# Patient Record
Sex: MALE | Race: WHITE | NOT HISPANIC OR LATINO | Employment: FULL TIME | URBAN - METROPOLITAN AREA
[De-identification: names, ages, dates, MRNs, and addresses within clinical notes are randomized per-mention and may not be internally consistent; named-entity substitution may affect disease eponyms.]

---

## 2019-09-10 ENCOUNTER — OFFICE VISIT (OUTPATIENT)
Dept: URGENT CARE | Facility: CLINIC | Age: 54
End: 2019-09-10
Payer: COMMERCIAL

## 2019-09-10 VITALS
DIASTOLIC BLOOD PRESSURE: 76 MMHG | OXYGEN SATURATION: 97 % | SYSTOLIC BLOOD PRESSURE: 135 MMHG | WEIGHT: 188 LBS | TEMPERATURE: 97.4 F | HEART RATE: 54 BPM | RESPIRATION RATE: 18 BRPM | BODY MASS INDEX: 27.85 KG/M2 | HEIGHT: 69 IN

## 2019-09-10 DIAGNOSIS — M54.2 NECK PAIN: Primary | ICD-10-CM

## 2019-09-10 PROCEDURE — 99203 OFFICE O/P NEW LOW 30 MIN: CPT | Performed by: PHYSICIAN ASSISTANT

## 2019-09-10 RX ORDER — CHLORAL HYDRATE 500 MG
1000 CAPSULE ORAL DAILY
COMMUNITY
End: 2022-03-30

## 2019-09-10 RX ORDER — METHOCARBAMOL 500 MG/1
500 TABLET, FILM COATED ORAL 3 TIMES DAILY
Qty: 20 TABLET | Refills: 0 | Status: SHIPPED | OUTPATIENT
Start: 2019-09-10 | End: 2019-10-07

## 2019-09-10 RX ORDER — MELOXICAM 7.5 MG/1
7.5 TABLET ORAL DAILY
Qty: 30 TABLET | Refills: 0 | Status: SHIPPED | OUTPATIENT
Start: 2019-09-10 | End: 2019-10-07

## 2019-09-10 RX ORDER — ASPIRIN 81 MG/1
81 TABLET, CHEWABLE ORAL DAILY
COMMUNITY
End: 2022-03-30

## 2019-09-10 NOTE — LETTER
September 10, 2019     Patient: Aleksandra Rincon   YOB: 1965   Date of Visit: 9/10/2019       To Whom It May Concern: It is my medical opinion that Aleksandra Rincon may return to work on 9/11/2019  If you have any questions or concerns, please don't hesitate to call           Sincerely,        Nino Corbin PA-C    CC: No Recipients

## 2019-09-10 NOTE — PROGRESS NOTES
[facility]        NAME: Bernadine Andrade is a 48 y o  male  : 1965    MRN: [id number]  DATE: September 10, 2019  TIME: 8:44 AM    Assessment and Plan   Neck pain [M54 2]  1  Neck pain  meloxicam (MOBIC) 7 5 mg tablet    methocarbamol (ROBAXIN) 500 mg tablet         Patient Instructions     Take Mobic as prescribed (starting tomorrow) and Robaxin  Do not take Mobic with other NSAIDs  Do not drive or operate heavy machinery while taking Robaxin  Try new pillow   Rest (for no longer than 24 hours)  Stretching exercises  Alternate ice and heat  Consider physical therapy if no improvement after 1 week  Follow up with PCP in 3-5 days  Proceed to  ER if symptoms worsen  Chief Complaint     Chief Complaint   Patient presents with    Neck Pain     neck pain started Saturday and has been getting worst,when pt  moves head to the left it is worst         History of Present Illness       Neck Pain    This is a new problem  Episode onset: Saturday  The problem occurs constantly  The problem has been unchanged  The pain is associated with a sleep position  The pain is present in the left side  The pain is at a severity of 9/10 (only with movement)  Exacerbated by: turning head to the left  Pertinent negatives include no chest pain, fever, headaches, numbness, tingling or weakness  He has tried heat (lidocaine patch-mild help) for the symptoms  Review of Systems   Review of Systems   Constitutional: Negative for activity change, appetite change, chills, fatigue and fever  Respiratory: Negative for cough and shortness of breath  Cardiovascular: Negative for chest pain and palpitations  Gastrointestinal: Negative for abdominal pain, blood in stool, diarrhea, nausea and vomiting  Genitourinary: Negative for dysuria, flank pain, frequency and urgency  Musculoskeletal: Positive for neck pain  Negative for myalgias and neck stiffness  Skin: Negative for rash     Neurological: Negative for dizziness, tingling, weakness, light-headedness, numbness and headaches  Current Medications       Current Outpatient Medications:     aspirin 81 mg chewable tablet, Chew 81 mg daily, Disp: , Rfl:     Omega-3 Fatty Acids (FISH OIL) 1,000 mg, Take 1,000 mg by mouth daily, Disp: , Rfl:     meloxicam (MOBIC) 7 5 mg tablet, Take 1 tablet (7 5 mg total) by mouth daily, Disp: 30 tablet, Rfl: 0    methocarbamol (ROBAXIN) 500 mg tablet, Take 1 tablet (500 mg total) by mouth 3 (three) times a day, Disp: 20 tablet, Rfl: 0    Current Allergies     Allergies as of 09/10/2019    (No Known Allergies)            The following portions of the patient's history were reviewed and updated as appropriate: allergies, current medications, past family history, past medical history, past social history, past surgical history and problem list      Past Medical History:   Diagnosis Date    Known health problems: none        Past Surgical History:   Procedure Laterality Date    ELBOW SURGERY      LEG SURGERY         History reviewed  No pertinent family history  Medications have been verified  Objective   /76   Pulse (!) 54   Temp (!) 97 4 °F (36 3 °C)   Resp 18   Ht 5' 9" (1 753 m)   Wt 85 3 kg (188 lb)   SpO2 97%   BMI 27 76 kg/m²        Physical Exam     Physical Exam   Constitutional: He appears well-developed and well-nourished  No distress  HENT:   Head: Normocephalic and atraumatic  Neck: Normal range of motion  Lateral cervical tenderness with L neck rotation  Cardiovascular: Normal rate, regular rhythm, normal heart sounds and intact distal pulses  Exam reveals no gallop and no friction rub  No murmur heard  Pulmonary/Chest: Effort normal and breath sounds normal  No respiratory distress  He has no wheezes  He has no rales  He exhibits no tenderness  Musculoskeletal: He exhibits no edema, tenderness or deformity  UE MS 5/5 bilaterally  Neurological: He is alert  He has normal reflexes  He displays normal reflexes  No sensory deficit  Coordination normal    Skin: Skin is warm  Psychiatric: He has a normal mood and affect  His behavior is normal  Judgment and thought content normal    Vitals reviewed

## 2019-09-10 NOTE — PATIENT INSTRUCTIONS
Take Mobic as prescribed (starting tomorrow) and Robaxin  Do not take Mobic with other NSAIDs  Do not drive or operate heavy machinery while taking Robaxin  Try new pillow   Rest (for no longer than 24 hours)  Stretching exercises  Alternate ice and heat  Consider physical therapy if no improvement after 1 week  Follow up with PCP in 3-5 days  Proceed to  ER if symptoms worsen  Acute Neck Pain   WHAT YOU NEED TO KNOW:   Acute neck pain starts suddenly, increases quickly, and goes away in a few days  The pain may come and go, or be worse with certain movements  The pain may be only in your neck, or it may move to your arms, back, or shoulders  You may also have pain that starts in another body area and moves to your neck  DISCHARGE INSTRUCTIONS:   Return to the emergency department if:   · You have an injury that causes neck pain and shooting pain down your arms or legs  · Your neck pain suddenly becomes severe  · You have neck pain along with numbness, tingling, or weakness in your arms or legs  · You have a stiff neck, a headache, and a fever  Contact your healthcare provider if:   · You have new or worsening symptoms  · Your symptoms continue even after treatment  · You have questions or concerns about your condition or care  Medicines:   · NSAIDs , such as ibuprofen, help decrease swelling, pain, and fever  This medicine is available without a doctor's order  Ask your healthcare provider which medicine to take and how often to take it  Follow directions  NSAIDs can cause stomach bleeding or kidney problems if not taken correctly  If you take blood thinner medicine, always ask if NSAIDs are safe for you  · Acetaminophen  helps decrease pain and fever  Ask your healthcare provider how much to take and how often to take it  Follow directions  Acetaminophen can cause liver damage if not taken correctly  · Steroid medicine  may be used to reduce inflammation   This can help relieve pain caused by swelling  · Take your medicine as directed  Contact your healthcare provider if you think your medicine is not helping or if you have side effects  Tell him or her if you are allergic to any medicine  Keep a list of the medicines, vitamins, and herbs you take  Include the amounts, and when and why you take them  Bring the list or the pill bottles to follow-up visits  Carry your medicine list with you in case of an emergency  Manage or prevent acute neck pain:   · Rest your neck as directed  Do not make sudden movements, such as turning your head quickly  Your healthcare provider may recommend you wear a cervical collar for a short time  The collar will prevent you from moving your head  This will give your neck time to heal if an injury is causing your neck pain  Ask your healthcare provider when you can return to sports or other normal daily activities  · Apply heat as directed  Heat helps relieve pain and swelling  Use a heat wrap, or soak a small towel in warm water  Wring out the extra water  Apply the heat wrap or towel for 20 minutes every hour, or as directed  · Apply ice as directed  Ice helps relieve pain and swelling, and can help prevent tissue damage  Use an ice pack, or put ice in a bag  Cover the ice pack or back with a towel before you apply it to your neck  Apply the ice pack or ice for 15 minutes every hour, or as directed  Your healthcare provider can tell you how often to apply ice  · Do neck exercises as directed  Neck exercises help strengthen the muscles and increase range of motion  Your healthcare provider will tell you which exercises are right for you  He may give you instructions, or he may recommend that you work with a physical therapist  Your healthcare provider or therapist can make sure you are doing the exercises correctly  · Maintain good posture  Try to keep your head and shoulders lifted when you sit   If you work in front of a computer, make sure the monitor is at the right level  You should not need to look up down to see the screen  You should also not have to lean forward to be able to read what is on the screen  Make sure your keyboard, mouse, and other computer items are placed where you do not have to extend your shoulder to reach them  Get up often if you work in front of a computer or sit for long periods of time  Stretch or walk around to keep your neck muscles loose  Follow up with your healthcare provider as directed: Your healthcare provider may refer you to a specialist if your pain does not get better with treatment  Write down your questions so you remember to ask them during your visits  © 2017 [facility] Information is for End User's use only and may not be sold, redistributed or otherwise used for commercial purposes  All illustrations and images included in CareNotes® are the copyrighted property of [employer]  or [employer]  The above information is an  only  It is not intended as medical advice for individual conditions or treatments  Talk to your doctor, nurse or pharmacist before following any medical regimen to see if it is safe and effective for you

## 2019-09-28 ENCOUNTER — OFFICE VISIT (OUTPATIENT)
Dept: URGENT CARE | Facility: CLINIC | Age: 54
End: 2019-09-28
Payer: COMMERCIAL

## 2019-09-28 VITALS
RESPIRATION RATE: 18 BRPM | OXYGEN SATURATION: 95 % | HEIGHT: 69 IN | TEMPERATURE: 97 F | WEIGHT: 186.29 LBS | HEART RATE: 70 BPM | BODY MASS INDEX: 27.59 KG/M2

## 2019-09-28 DIAGNOSIS — S71.111A LACERATION OF SKIN OF RIGHT THIGH, INITIAL ENCOUNTER: Primary | ICD-10-CM

## 2019-09-28 DIAGNOSIS — Z23 NEED FOR TETANUS BOOSTER: ICD-10-CM

## 2019-09-28 PROCEDURE — 99213 OFFICE O/P EST LOW 20 MIN: CPT | Performed by: EMERGENCY MEDICINE

## 2019-09-28 PROCEDURE — 12002 RPR S/N/AX/GEN/TRNK2.6-7.5CM: CPT | Performed by: EMERGENCY MEDICINE

## 2019-09-28 PROCEDURE — 90471 IMMUNIZATION ADMIN: CPT | Performed by: EMERGENCY MEDICINE

## 2019-09-28 PROCEDURE — 90715 TDAP VACCINE 7 YRS/> IM: CPT

## 2019-09-28 RX ORDER — CEPHALEXIN 500 MG/1
500 CAPSULE ORAL EVERY 8 HOURS SCHEDULED
Qty: 15 CAPSULE | Refills: 0 | Status: SHIPPED | OUTPATIENT
Start: 2019-09-28 | End: 2019-10-03

## 2019-09-28 NOTE — PATIENT INSTRUCTIONS
Staple Care   WHAT YOU NEED TO KNOW:   Staples are often used to close a wound  Your staples may be placed for 3 to 14 days, depending on the location of your wound  DISCHARGE INSTRUCTIONS:   Care for your wound:   · Clean:      ¨ You may be able to shower in 24 hours  Do not soak your wound under water  ¨ Gently wash your wound with soap and warm water daily  Lightly pat it dry  Do not cover your wound unless your healthcare provider tells you to  ¨ You may also need to clean your wound with a mixture of hydrogen peroxide and water  Ask how to do this  ¨ Do not apply ointment or cream to the wound unless your healthcare provider tells you to  · Elevate:      ¨ Rest any arm or leg that has a wound on pillows above the level of your heart  Do this as often as possible for 2 days  This will help decrease swelling and pain, and help you heal faster  · Minimize scarring:      ¨ Avoid sunshine on your wound to reduce scarring  Follow up with your healthcare provider as directed: You may need to return for a wound checkup 3 days after your staples are placed  Ask when you should return to get your staples removed  Staple removal:   · A medical staple remover  will be used to take out your staples  Your healthcare provider will slide the tool under each staple, squeeze the handle, and gently pull the staple out  · Medical tape  will be placed on your wound once your staples are removed  This will help keep your wound closed  The medical tape will fall off on its own after several days  Contact your healthcare provider if:   · You have redness, pain, swelling, and pus draining from your wound  · Your pain medicine does not relieve your pain  · You have a fever of 101°F (38 5°C) or higher  · You have an odor coming from your wound  · You have questions or concerns about your condition or care  Return to the emergency department if:   · Your wound reopens      · You have red streaks in your skin that spread out from your wound  · You have severe pain or vomiting  © 2017 [address] [employer] Information is for End User's use only and may not be sold, redistributed or otherwise used for commercial purposes  All illustrations and images included in CareNotes® are the copyrighted property of [employer]  or [employer]  The above information is an  only  It is not intended as medical advice for individual conditions or treatments  Talk to your doctor, nurse or pharmacist before following any medical regimen to see if it is safe and effective for you  Laceration   WHAT YOU NEED TO KNOW:   A laceration is an injury to the skin and the soft tissue underneath it  Lacerations happen when you are cut or hit by something  They can happen anywhere on the body  DISCHARGE INSTRUCTIONS:   Return to the emergency department if:   · You have heavy bleeding or bleeding that does not stop after 10 minutes of holding firm, direct pressure over the wound  · Your wound opens up  Contact your healthcare provider if:   · You have a fever or chills  · Your laceration is red, warm, or swollen  · You have red streaks on your skin coming from your wound  · You have white or yellow drainage from the wound that smells bad  · You have pain that gets worse, even after treatment  · You have questions or concerns about your condition or care  Medicines:   · Prescription pain medicine  may be given  Ask how to take this medicine safely  · Antibiotics  help treat or prevent a bacterial infection  · Take your medicine as directed  Contact your healthcare provider if you think your medicine is not helping or if you have side effects  Tell him or her if you are allergic to any medicine  Keep a list of the medicines, vitamins, and herbs you take  Include the amounts, and when and why you take them  Bring the list or the pill bottles to follow-up visits  Carry your medicine list with you in case of an emergency  Care for your wound as directed:   · Do not get your wound wet  until your healthcare provider says it is okay  Do not soak your wound in water  Do not go swimming until your healthcare provider says it is okay  Carefully wash the wound with soap and water  Gently pat the area dry or allow it to air dry  · Change your bandages  when they get wet, dirty, or after washing  Apply new, clean bandages as directed  Do not apply elastic bandages or tape too tight  Do not put powders or lotions over your incision  · Apply antibiotic ointment as directed  Your healthcare provider may give you antibiotic ointment to put over your wound if you have stitches  If you have strips of tape over your incision, let them dry up and fall off on their own  If they do not fall off within 14 days, gently remove them  If you have glue over your wound, do not remove or pick at it  If your glue comes off, do not replace it with glue that you have at home  · Check your wound every day for signs of infection such as swelling, redness, or pus  Self-care:   · Apply ice  on your wound for 15 to 20 minutes every hour or as directed  Use an ice pack, or put crushed ice in a plastic bag  Cover it with a towel  Ice helps prevent tissue damage and decreases swelling and pain  · Use a splint as directed  A splint will decrease movement and stress on your wound  It may help it heal faster  A splint may be used for lacerations over joints or areas of your body that bend  Ask your healthcare provider how to apply and remove a splint  · Decrease scarring of your wound  by applying ointments as directed  Do not apply ointments until your healthcare provider says it is okay  You may need to wait until your wound is healed  Ask which ointment to buy and how often to use it  After your wound is healed, use sunscreen over the area when you are out in the sun   You should do this for at least 6 months to 1 year after your injury  Follow up with your healthcare provider as directed: You may need to follow up in 24 to 48 hours to have your wound checked for infection  You will need to return in 3 to 14 days if you have stitches or staples so they can be removed  Care for your wound as directed to prevent infection and help it heal  Write down your questions so you remember to ask them during your visits  © 2017 [facility] Information is for End User's use only and may not be sold, redistributed or otherwise used for commercial purposes  All illustrations and images included in CareNotes® are the copyrighted property of [employer]  or [employer] Cher[employer]  The above information is an  only  It is not intended as medical advice for individual conditions or treatments  Talk to your doctor, nurse or pharmacist before following any medical regimen to see if it is safe and effective for you

## 2019-09-28 NOTE — PROGRESS NOTES
[facility]        NAME: Caroline Dumont is a 48 y o  male  : 1965    MRN: [id number]  DATE: 2019  TIME: 1:18 PM    Assessment and Plan   Laceration of skin of right thigh, initial encounter [S71 111A]  1  Laceration of skin of right thigh, initial encounter  cephalexin (KEFLEX) 500 mg capsule    TDAP Vaccine greater than or equal to 8yo   2  Need for tetanus booster       Laceration repair  Date/Time: 2019 1:03 PM  Performed by: Kai Rodriguez MD  Authorized by: Kai Rodriguez MD   Consent: Verbal consent obtained  Risks and benefits: risks, benefits and alternatives were discussed  Consent given by: patient  Patient understanding: patient states understanding of the procedure being performed  Patient consent: the patient's understanding of the procedure matches consent given  Procedure consent: procedure consent matches procedure scheduled  Patient identity confirmed: verbally with patient  Body area: lower extremity  Location details: right upper leg  Laceration length: 4 5 cm  Foreign bodies: no foreign bodies  Tendon involvement: none  Nerve involvement: none  Vascular damage: no  Anesthesia: local infiltration    Anesthesia:  Local Anesthetic: lidocaine 1% with epinephrine  Anesthetic total: 2 mL    Sedation:  Patient sedated: no      Wound Dehiscence:  Superficial Wound Dehiscence: simple closure      Procedure Details:  Preparation: Patient was prepped and draped in the usual sterile fashion    Irrigation solution: saline  Irrigation method: syringe  Amount of cleaning: standard  Debridement: none  Degree of undermining: none  Skin closure: staples  Number of sutures: 8  Technique: simple  Approximation: close  Approximation difficulty: simple  Dressing: 4x4 sterile gauze and antibiotic ointment  Patient tolerance: Patient tolerated the procedure well with no immediate complications            Patient Instructions     Patient Instructions   Staple Care   WHAT YOU NEED TO KNOW:   Jose Raula Giselle are often used to close a wound  Your staples may be placed for 3 to 14 days, depending on the location of your wound  DISCHARGE INSTRUCTIONS:   Care for your wound:   · Clean:      ¨ You may be able to shower in 24 hours  Do not soak your wound under water  ¨ Gently wash your wound with soap and warm water daily  Lightly pat it dry  Do not cover your wound unless your healthcare provider tells you to  ¨ You may also need to clean your wound with a mixture of hydrogen peroxide and water  Ask how to do this  ¨ Do not apply ointment or cream to the wound unless your healthcare provider tells you to  · Elevate:      ¨ Rest any arm or leg that has a wound on pillows above the level of your heart  Do this as often as possible for 2 days  This will help decrease swelling and pain, and help you heal faster  · Minimize scarring:      ¨ Avoid sunshine on your wound to reduce scarring  Follow up with your healthcare provider as directed: You may need to return for a wound checkup 3 days after your staples are placed  Ask when you should return to get your staples removed  Staple removal:   · A medical staple remover  will be used to take out your staples  Your healthcare provider will slide the tool under each staple, squeeze the handle, and gently pull the staple out  · Medical tape  will be placed on your wound once your staples are removed  This will help keep your wound closed  The medical tape will fall off on its own after several days  Contact your healthcare provider if:   · You have redness, pain, swelling, and pus draining from your wound  · Your pain medicine does not relieve your pain  · You have a fever of 101°F (38 5°C) or higher  · You have an odor coming from your wound  · You have questions or concerns about your condition or care  Return to the emergency department if:   · Your wound reopens      · You have red streaks in your skin that spread out from your wound  · You have severe pain or vomiting  © 2017 [employer] [employer] Information is for End User's use only and may not be sold, redistributed or otherwise used for commercial purposes  All illustrations and images included in CareNotes® are the copyrighted property of [employer]  or Ever[employer] Chery  The above information is an  only  It is not intended as medical advice for individual conditions or treatments  Talk to your doctor, nurse or pharmacist before following any medical regimen to see if it is safe and effective for you  Laceration   WHAT YOU NEED TO KNOW:   A laceration is an injury to the skin and the soft tissue underneath it  Lacerations happen when you are cut or hit by something  They can happen anywhere on the body  DISCHARGE INSTRUCTIONS:   Return to the emergency department if:   · You have heavy bleeding or bleeding that does not stop after 10 minutes of holding firm, direct pressure over the wound  · Your wound opens up  Contact your healthcare provider if:   · You have a fever or chills  · Your laceration is red, warm, or swollen  · You have red streaks on your skin coming from your wound  · You have white or yellow drainage from the wound that smells bad  · You have pain that gets worse, even after treatment  · You have questions or concerns about your condition or care  Medicines:   · Prescription pain medicine  may be given  Ask how to take this medicine safely  · Antibiotics  help treat or prevent a bacterial infection  · Take your medicine as directed  Contact your healthcare provider if you think your medicine is not helping or if you have side effects  Tell him or her if you are allergic to any medicine  Keep a list of the medicines, vitamins, and herbs you take  Include the amounts, and when and why you take them  Bring the list or the pill bottles to follow-up visits   Carry your medicine list with you in case of an emergency  Care for your wound as directed:   · Do not get your wound wet  until your healthcare provider says it is okay  Do not soak your wound in water  Do not go swimming until your healthcare provider says it is okay  Carefully wash the wound with soap and water  Gently pat the area dry or allow it to air dry  · Change your bandages  when they get wet, dirty, or after washing  Apply new, clean bandages as directed  Do not apply elastic bandages or tape too tight  Do not put powders or lotions over your incision  · Apply antibiotic ointment as directed  Your healthcare provider may give you antibiotic ointment to put over your wound if you have stitches  If you have strips of tape over your incision, let them dry up and fall off on their own  If they do not fall off within 14 days, gently remove them  If you have glue over your wound, do not remove or pick at it  If your glue comes off, do not replace it with glue that you have at home  · Check your wound every day for signs of infection such as swelling, redness, or pus  Self-care:   · Apply ice  on your wound for 15 to 20 minutes every hour or as directed  Use an ice pack, or put crushed ice in a plastic bag  Cover it with a towel  Ice helps prevent tissue damage and decreases swelling and pain  · Use a splint as directed  A splint will decrease movement and stress on your wound  It may help it heal faster  A splint may be used for lacerations over joints or areas of your body that bend  Ask your healthcare provider how to apply and remove a splint  · Decrease scarring of your wound  by applying ointments as directed  Do not apply ointments until your healthcare provider says it is okay  You may need to wait until your wound is healed  Ask which ointment to buy and how often to use it  After your wound is healed, use sunscreen over the area when you are out in the sun   You should do this for at least 6 months to 1 year after your injury  Follow up with your healthcare provider as directed: You may need to follow up in 24 to 48 hours to have your wound checked for infection  You will need to return in 3 to 14 days if you have stitches or staples so they can be removed  Care for your wound as directed to prevent infection and help it heal  Write down your questions so you remember to ask them during your visits  © 2017 [address] [employer] Information is for End User's use only and may not be sold, redistributed or otherwise used for commercial purposes  All illustrations and images included in CareNotes® are the copyrighted property of [employer]  or Ever[employer] Chery  The above information is an  only  It is not intended as medical advice for individual conditions or treatments  Talk to your doctor, nurse or pharmacist before following any medical regimen to see if it is safe and effective for you  Follow up with PCP in 3-5 days  Proceed to  ER if symptoms worsen  Chief Complaint     Chief Complaint   Patient presents with    Laceration     RIGHT UPPER THIGH         History of Present Illness       Patient complains of laceration to right upper thigh      Review of Systems   Review of Systems   Constitutional: Negative for chills and fever  Musculoskeletal: Negative for arthralgias and joint swelling  Skin: Positive for wound  Negative for color change and rash  Neurological: Negative for numbness           Current Medications       Current Outpatient Medications:     aspirin 81 mg chewable tablet, Chew 81 mg daily, Disp: , Rfl:     Omega-3 Fatty Acids (FISH OIL) 1,000 mg, Take 1,000 mg by mouth daily, Disp: , Rfl:     cephalexin (KEFLEX) 500 mg capsule, Take 1 capsule (500 mg total) by mouth every 8 (eight) hours for 5 days, Disp: 15 capsule, Rfl: 0    meloxicam (MOBIC) 7 5 mg tablet, Take 1 tablet (7 5 mg total) by mouth daily (Patient not taking: Reported on 9/28/2019), Disp: 30 tablet, Rfl: 0    methocarbamol (ROBAXIN) 500 mg tablet, Take 1 tablet (500 mg total) by mouth 3 (three) times a day (Patient not taking: Reported on 9/28/2019), Disp: 20 tablet, Rfl: 0    Current Allergies     Allergies as of 09/28/2019    (No Known Allergies)            The following portions of the patient's history were reviewed and updated as appropriate: allergies, current medications, past family history, past medical history, past social history, past surgical history and problem list      Past Medical History:   Diagnosis Date    Known health problems: none        Past Surgical History:   Procedure Laterality Date    ELBOW SURGERY      LEG SURGERY         Family History   Problem Relation Age of Onset    No Known Problems Mother     No Known Problems Father          Medications have been verified  Objective   Pulse 70   Temp (!) 97 °F (36 1 °C) (Tympanic)   Resp 18   Ht 5' 9" (1 753 m)   Wt 84 5 kg (186 lb 4 6 oz)   SpO2 95%   BMI 27 51 kg/m²        Physical Exam     Physical Exam   Constitutional: He is oriented to person, place, and time  He appears well-developed and well-nourished  No distress  Neck: Neck supple  Musculoskeletal: He exhibits no tenderness  Neurological: He is alert and oriented to person, place, and time  Skin: Skin is warm and dry  No rash noted  No erythema  Superficial clean linear laceration right upper thigh   Psychiatric: He has a normal mood and affect  His behavior is normal  Judgment and thought content normal    Nursing note and vitals reviewed

## 2019-10-07 ENCOUNTER — OFFICE VISIT (OUTPATIENT)
Dept: URGENT CARE | Facility: CLINIC | Age: 54
End: 2019-10-07
Payer: COMMERCIAL

## 2019-10-07 VITALS
HEART RATE: 48 BPM | WEIGHT: 186 LBS | BODY MASS INDEX: 27.55 KG/M2 | TEMPERATURE: 98 F | SYSTOLIC BLOOD PRESSURE: 155 MMHG | HEIGHT: 69 IN | RESPIRATION RATE: 16 BRPM | DIASTOLIC BLOOD PRESSURE: 83 MMHG

## 2019-10-07 DIAGNOSIS — S71.111S: Primary | ICD-10-CM

## 2019-10-07 DIAGNOSIS — L08.9 WOUND INFECTION: ICD-10-CM

## 2019-10-07 DIAGNOSIS — T14.8XXA WOUND INFECTION: ICD-10-CM

## 2019-10-07 PROCEDURE — 99213 OFFICE O/P EST LOW 20 MIN: CPT | Performed by: EMERGENCY MEDICINE

## 2019-10-07 RX ORDER — SULFAMETHOXAZOLE AND TRIMETHOPRIM 800; 160 MG/1; MG/1
1 TABLET ORAL EVERY 12 HOURS SCHEDULED
Qty: 14 TABLET | Refills: 0 | Status: SHIPPED | OUTPATIENT
Start: 2019-10-07 | End: 2019-10-14

## 2019-10-07 NOTE — PROGRESS NOTES
[facility]        NAME: Sheryle Blower  is a 48 y o  male  : 1965    MRN: [id number]  DATE: 2019  TIME: 2:59 PM    Assessment and Plan   Laceration of right thigh without complication, sequela [F77 397W]  1  Laceration of right thigh without complication, sequela  sulfamethoxazole-trimethoprim (BACTRIM DS) 800-160 mg per tablet   2  Wound infection     Stapled wound right upper thigh with significant erythema and some purulent drainage, therefore will switch patient to Bactrim DS and have him return in 5 days for likely staple removal       Patient Instructions     Patient Instructions   Staple Care   WHAT YOU NEED TO KNOW:   Staples are often used to close a wound  Your staples may be placed for 3 to 14 days, depending on the location of your wound  DISCHARGE INSTRUCTIONS:   Care for your wound:   · Clean:      ¨ You may be able to shower in 24 hours  Do not soak your wound under water  ¨ Gently wash your wound with soap and warm water daily  Lightly pat it dry  Do not cover your wound unless your healthcare provider tells you to  ¨ You may also need to clean your wound with a mixture of hydrogen peroxide and water  Ask how to do this  ¨ Do not apply ointment or cream to the wound unless your healthcare provider tells you to  · Elevate:      ¨ Rest any arm or leg that has a wound on pillows above the level of your heart  Do this as often as possible for 2 days  This will help decrease swelling and pain, and help you heal faster  · Minimize scarring:      ¨ Avoid sunshine on your wound to reduce scarring  Follow up with your healthcare provider as directed: You may need to return for a wound checkup 3 days after your staples are placed  Ask when you should return to get your staples removed  Staple removal:   · A medical staple remover  will be used to take out your staples   Your healthcare provider will slide the tool under each staple, squeeze the handle, and gently pull the staple out  · Medical tape  will be placed on your wound once your staples are removed  This will help keep your wound closed  The medical tape will fall off on its own after several days  Contact your healthcare provider if:   · You have redness, pain, swelling, and pus draining from your wound  · Your pain medicine does not relieve your pain  · You have a fever of 101°F (38 5°C) or higher  · You have an odor coming from your wound  · You have questions or concerns about your condition or care  Return to the emergency department if:   · Your wound reopens  · You have red streaks in your skin that spread out from your wound  · You have severe pain or vomiting  © 2017 [address]  Information is for End User's use only and may not be sold, redistributed or otherwise used for commercial purposes  All illustrations and images included in CareNotes® are the copyrighted property of [employer]  or Everardo Chery  The above information is an  only  It is not intended as medical advice for individual conditions or treatments  Talk to your doctor, nurse or pharmacist before following any medical regimen to see if it is safe and effective for you  Wound Infection   AMBULATORY CARE:   A wound infection  occurs when bacteria enters a break in the skin  The infection may involve just the skin, or affect deeper tissues or organs close to the wound  Signs and symptoms of a wound infection:  Your symptoms may start a few days after you get the wound, or may not occur for a month or two after the wound happens:  · Fever    · Warm, red, painful, or swollen skin near the wound    · Blood or pus coming from the wound     · A foul odor coming from the wound  Seek care immediately if:   · You feel short of breath  · Your heart is beating faster than usual      · You feel confused  · Blood soaks through your bandages      · Your wound comes apart or feels like it is ripping  · You have severe pain  · You see red streaks coming from the infected area  Contact your healthcare provider if:   · You have a fever or chills  · You have more pain, redness, or swelling near your wound  · Your symptoms do not improve  · The skin around your wound feels numb  · You have questions or concerns about your condition or care  Treatment for a wound infection  will depend on how severe the wound is, its location, and whether other areas are affected  It may also depend on your health and the length of time you have had the wound  Ask your healthcare provider about these and other treatments you may need:  · Medicine  will be given to treat the infection and decrease pain and swelling  · Wound care  may be done to clean your wound and help it heal  A wound vacuum may also be placed over your wound to help it heal      · Hyperbaric oxygen therapy  (HBO) may be used to get more oxygen to your tissues to help them heal  The pressurized oxygen is given as you sit in a pressure chamber  · Surgery  may be needed to clean the wound or remove infected or dead tissue  Surgery may also be needed to remove a foreign object  Care for your wound as directed:  Keep your wound clean and dry  You may need to cover your wound when you bathe so it does not get wet  Clean your wound as directed with soap and water or wound   Put on new, clean bandages as directed  Change your bandages when they get wet or dirty  Help your wound heal:   · Eat a variety of healthy foods  Examples include fruits, vegetables, whole-grain breads, low-fat dairy products, beans, lean meats, and fish  Healthy foods may help you heal faster  You may also need to take vitamins and minerals  Ask if you need to be on a special diet  · Manage other health conditions  Follow your healthcare provider's directions to manage health conditions that can cause slow wound healing  Examples include high blood pressure and diabetes  · Do not smoke  Nicotine and other chemicals in cigarettes and cigars can cause slow wound healing  Ask your healthcare provider for information if you currently smoke and need help to quit  E-cigarettes or smokeless tobacco still contain nicotine  Talk to your healthcare provider before you use these products  Follow up with your healthcare provider in 1 to 2 days:  Write down your questions so you remember to ask them during your visits  © 2017 [address]  Information is for End User's use only and may not be sold, redistributed or otherwise used for commercial purposes  All illustrations and images included in CareNotes® are the copyrighted property of [employer]  or [employer]  The above information is an  only  It is not intended as medical advice for individual conditions or treatments  Talk to your doctor, nurse or pharmacist before following any medical regimen to see if it is safe and effective for you  Follow up with PCP in 3-5 days  Proceed to  ER if symptoms worsen  Chief Complaint     Chief Complaint   Patient presents with    Suture / Staple Removal     staple removal from right thigh         History of Present Illness       Here for likely staple removal 9 days after repair  Wound is still draining  He is taking the cephalexin as prescribed  Review of Systems   Review of Systems   Constitutional: Negative for fever  Skin: Negative for color change           Current Medications       Current Outpatient Medications:     aspirin 81 mg chewable tablet, Chew 81 mg daily, Disp: , Rfl:     Omega-3 Fatty Acids (FISH OIL) 1,000 mg, Take 1,000 mg by mouth daily, Disp: , Rfl:     sulfamethoxazole-trimethoprim (BACTRIM DS) 800-160 mg per tablet, Take 1 tablet by mouth every 12 (twelve) hours for 7 days, Disp: 14 tablet, Rfl: 0    Current Allergies     Allergies as of 10/07/2019    (No Known Allergies)            The following portions of the patient's history were reviewed and updated as appropriate: allergies, current medications, past family history, past medical history, past social history, past surgical history and problem list      Past Medical History:   Diagnosis Date    Known health problems: none        Past Surgical History:   Procedure Laterality Date    ELBOW SURGERY      LEG SURGERY         Family History   Problem Relation Age of Onset    No Known Problems Mother     Heart disease Father     COPD Father          Medications have been verified  Objective   /83   Pulse (!) 48   Temp 98 °F (36 7 °C) (Tympanic)   Resp 16   Ht 5' 9" (1 753 m)   Wt 84 4 kg (186 lb)   BMI 27 47 kg/m²        Physical Exam     Physical Exam   Skin: Skin is warm and dry  There is erythema  Nursing note and vitals reviewed

## 2019-10-07 NOTE — PATIENT INSTRUCTIONS
Staple Care   WHAT YOU NEED TO KNOW:   Staples are often used to close a wound  Your staples may be placed for 3 to 14 days, depending on the location of your wound  DISCHARGE INSTRUCTIONS:   Care for your wound:   · Clean:      ¨ You may be able to shower in 24 hours  Do not soak your wound under water  ¨ Gently wash your wound with soap and warm water daily  Lightly pat it dry  Do not cover your wound unless your healthcare provider tells you to  ¨ You may also need to clean your wound with a mixture of hydrogen peroxide and water  Ask how to do this  ¨ Do not apply ointment or cream to the wound unless your healthcare provider tells you to  · Elevate:      ¨ Rest any arm or leg that has a wound on pillows above the level of your heart  Do this as often as possible for 2 days  This will help decrease swelling and pain, and help you heal faster  · Minimize scarring:      ¨ Avoid sunshine on your wound to reduce scarring  Follow up with your healthcare provider as directed: You may need to return for a wound checkup 3 days after your staples are placed  Ask when you should return to get your staples removed  Staple removal:   · A medical staple remover  will be used to take out your staples  Your healthcare provider will slide the tool under each staple, squeeze the handle, and gently pull the staple out  · Medical tape  will be placed on your wound once your staples are removed  This will help keep your wound closed  The medical tape will fall off on its own after several days  Contact your healthcare provider if:   · You have redness, pain, swelling, and pus draining from your wound  · Your pain medicine does not relieve your pain  · You have a fever of 101°F (38 5°C) or higher  · You have an odor coming from your wound  · You have questions or concerns about your condition or care  Return to the emergency department if:   · Your wound reopens      · You have red streaks in your skin that spread out from your wound  · You have severe pain or vomiting  © 2017 [employer] Information is for End User's use only and may not be sold, redistributed or otherwise used for commercial purposes  All illustrations and images included in CareNotes® are the copyrighted property of [employer]  or [employer] Cher[employer]  The above information is an  only  It is not intended as medical advice for individual conditions or treatments  Talk to your doctor, nurse or pharmacist before following any medical regimen to see if it is safe and effective for you  Wound Infection   AMBULATORY CARE:   A wound infection  occurs when bacteria enters a break in the skin  The infection may involve just the skin, or affect deeper tissues or organs close to the wound  Signs and symptoms of a wound infection:  Your symptoms may start a few days after you get the wound, or may not occur for a month or two after the wound happens:  · Fever    · Warm, red, painful, or swollen skin near the wound    · Blood or pus coming from the wound     · A foul odor coming from the wound  Seek care immediately if:   · You feel short of breath  · Your heart is beating faster than usual      · You feel confused  · Blood soaks through your bandages  · Your wound comes apart or feels like it is ripping  · You have severe pain  · You see red streaks coming from the infected area  Contact your healthcare provider if:   · You have a fever or chills  · You have more pain, redness, or swelling near your wound  · Your symptoms do not improve  · The skin around your wound feels numb  · You have questions or concerns about your condition or care  Treatment for a wound infection  will depend on how severe the wound is, its location, and whether other areas are affected  It may also depend on your health and the length of time you have had the wound   Ask your healthcare provider about these and other treatments you may need:  · Medicine  will be given to treat the infection and decrease pain and swelling  · Wound care  may be done to clean your wound and help it heal  A wound vacuum may also be placed over your wound to help it heal      · Hyperbaric oxygen therapy  (HBO) may be used to get more oxygen to your tissues to help them heal  The pressurized oxygen is given as you sit in a pressure chamber  · Surgery  may be needed to clean the wound or remove infected or dead tissue  Surgery may also be needed to remove a foreign object  Care for your wound as directed:  Keep your wound clean and dry  You may need to cover your wound when you bathe so it does not get wet  Clean your wound as directed with soap and water or wound   Put on new, clean bandages as directed  Change your bandages when they get wet or dirty  Help your wound heal:   · Eat a variety of healthy foods  Examples include fruits, vegetables, whole-grain breads, low-fat dairy products, beans, lean meats, and fish  Healthy foods may help you heal faster  You may also need to take vitamins and minerals  Ask if you need to be on a special diet  · Manage other health conditions  Follow your healthcare provider's directions to manage health conditions that can cause slow wound healing  Examples include high blood pressure and diabetes  · Do not smoke  Nicotine and other chemicals in cigarettes and cigars can cause slow wound healing  Ask your healthcare provider for information if you currently smoke and need help to quit  E-cigarettes or smokeless tobacco still contain nicotine  Talk to your healthcare provider before you use these products  Follow up with your healthcare provider in 1 to 2 days:  Write down your questions so you remember to ask them during your visits    © 2017 [employer] Information is for End User's use only and may not be sold, redistributed or otherwise used for commercial purposes  All illustrations and images included in CareNotes® are the copyrighted property of [employer]  or [employer]  The above information is an  only  It is not intended as medical advice for individual conditions or treatments  Talk to your doctor, nurse or pharmacist before following any medical regimen to see if it is safe and effective for you

## 2019-10-11 ENCOUNTER — OFFICE VISIT (OUTPATIENT)
Dept: URGENT CARE | Facility: CLINIC | Age: 54
End: 2019-10-11
Payer: COMMERCIAL

## 2019-10-11 VITALS
TEMPERATURE: 99.5 F | HEIGHT: 69 IN | RESPIRATION RATE: 18 BRPM | OXYGEN SATURATION: 96 % | BODY MASS INDEX: 27.59 KG/M2 | HEART RATE: 58 BPM | WEIGHT: 186.29 LBS | SYSTOLIC BLOOD PRESSURE: 138 MMHG | DIASTOLIC BLOOD PRESSURE: 77 MMHG

## 2019-10-11 DIAGNOSIS — Z48.02 ENCOUNTER FOR STAPLE REMOVAL: Primary | ICD-10-CM

## 2019-10-11 PROCEDURE — 99212 OFFICE O/P EST SF 10 MIN: CPT | Performed by: EMERGENCY MEDICINE

## 2019-10-11 NOTE — PROGRESS NOTES
[facility]        NAME: Kary Chen  is a 48 y o  male  : 1965    MRN: [id number]  DATE: 2019  TIME: 3:18 PM    Assessment and Plan   Encounter for staple removal [Z48 02]  1  Encounter for staple removal           Patient Instructions     Patient Instructions   Staple Care   WHAT YOU NEED TO KNOW:   Staples are often used to close a wound  Your staples may be placed for 3 to 14 days, depending on the location of your wound  DISCHARGE INSTRUCTIONS:   Care for your wound:   · Clean:      ¨ You may be able to shower in 24 hours  Do not soak your wound under water  ¨ Gently wash your wound with soap and warm water daily  Lightly pat it dry  Do not cover your wound unless your healthcare provider tells you to  ¨ You may also need to clean your wound with a mixture of hydrogen peroxide and water  Ask how to do this  ¨ Do not apply ointment or cream to the wound unless your healthcare provider tells you to  · Elevate:      ¨ Rest any arm or leg that has a wound on pillows above the level of your heart  Do this as often as possible for 2 days  This will help decrease swelling and pain, and help you heal faster  · Minimize scarring:      ¨ Avoid sunshine on your wound to reduce scarring  Follow up with your healthcare provider as directed: You may need to return for a wound checkup 3 days after your staples are placed  Ask when you should return to get your staples removed  Staple removal:   · A medical staple remover  will be used to take out your staples  Your healthcare provider will slide the tool under each staple, squeeze the handle, and gently pull the staple out  · Medical tape  will be placed on your wound once your staples are removed  This will help keep your wound closed  The medical tape will fall off on its own after several days  Contact your healthcare provider if:   · You have redness, pain, swelling, or pus draining from your wound      · Your pain medicine does not relieve your pain  · You have a fever of 101°F (38 5°C) or higher  · You have an odor coming from your wound  · You have questions or concerns about your condition or care  Seek care immediately if:   · Your wound reopens  · You have red streaks on your skin that spread out from your wound  · You have severe pain or vomiting  © 2017 [employer] Information is for End User's use only and may not be sold, redistributed or otherwise used for commercial purposes  All illustrations and images included in CareNotes® are the copyrighted property of [employer]  or [employer]  The above information is an  only  It is not intended as medical advice for individual conditions or treatments  Talk to your doctor, nurse or pharmacist before following any medical regimen to see if it is safe and effective for you  Follow up with PCP in 3-5 days  Proceed to  ER if symptoms worsen  Chief Complaint     Chief Complaint   Patient presents with    Suture / Staple Removal     right thigh         History of Present Illness       Patient here for presumed staple removal 13 days after laceration repair  He has no complaints  Review of Systems   Review of Systems   Constitutional: Negative for fever  Skin: Negative for color change           Current Medications       Current Outpatient Medications:     aspirin 81 mg chewable tablet, Chew 81 mg daily, Disp: , Rfl:     Omega-3 Fatty Acids (FISH OIL) 1,000 mg, Take 1,000 mg by mouth daily, Disp: , Rfl:     sulfamethoxazole-trimethoprim (BACTRIM DS) 800-160 mg per tablet, Take 1 tablet by mouth every 12 (twelve) hours for 7 days, Disp: 14 tablet, Rfl: 0    Current Allergies     Allergies as of 10/11/2019    (No Known Allergies)            The following portions of the patient's history were reviewed and updated as appropriate: allergies, current medications, past family history, past medical history, past social history, past surgical history and problem list      Past Medical History:   Diagnosis Date    Known health problems: none        Past Surgical History:   Procedure Laterality Date    ELBOW SURGERY      LEG SURGERY         Family History   Problem Relation Age of Onset    No Known Problems Mother     Heart disease Father     COPD Father          Medications have been verified  Objective   /77 (BP Location: Left arm, Patient Position: Sitting, Cuff Size: Adult)   Pulse 58   Temp 99 5 °F (37 5 °C) (Tympanic)   Resp 18   Ht 5' 9" (1 753 m)   Wt 84 5 kg (186 lb 4 6 oz)   SpO2 96%   BMI 27 51 kg/m²        Physical Exam     Physical Exam   Skin: Skin is warm and dry  No erythema  Nursing note and vitals reviewed

## 2019-10-11 NOTE — PATIENT INSTRUCTIONS
Staple Care   WHAT YOU NEED TO KNOW:   Staples are often used to close a wound  Your staples may be placed for 3 to 14 days, depending on the location of your wound  DISCHARGE INSTRUCTIONS:   Care for your wound:   · Clean:      ¨ You may be able to shower in 24 hours  Do not soak your wound under water  ¨ Gently wash your wound with soap and warm water daily  Lightly pat it dry  Do not cover your wound unless your healthcare provider tells you to  ¨ You may also need to clean your wound with a mixture of hydrogen peroxide and water  Ask how to do this  ¨ Do not apply ointment or cream to the wound unless your healthcare provider tells you to  · Elevate:      ¨ Rest any arm or leg that has a wound on pillows above the level of your heart  Do this as often as possible for 2 days  This will help decrease swelling and pain, and help you heal faster  · Minimize scarring:      ¨ Avoid sunshine on your wound to reduce scarring  Follow up with your healthcare provider as directed: You may need to return for a wound checkup 3 days after your staples are placed  Ask when you should return to get your staples removed  Staple removal:   · A medical staple remover  will be used to take out your staples  Your healthcare provider will slide the tool under each staple, squeeze the handle, and gently pull the staple out  · Medical tape  will be placed on your wound once your staples are removed  This will help keep your wound closed  The medical tape will fall off on its own after several days  Contact your healthcare provider if:   · You have redness, pain, swelling, or pus draining from your wound  · Your pain medicine does not relieve your pain  · You have a fever of 101°F (38 5°C) or higher  · You have an odor coming from your wound  · You have questions or concerns about your condition or care  Seek care immediately if:   · Your wound reopens      · You have red streaks on your skin that spread out from your wound  · You have severe pain or vomiting  © 2017 [address] [employer] Information is for End User's use only and may not be sold, redistributed or otherwise used for commercial purposes  All illustrations and images included in CareNotes® are the copyrighted property of [employer]  or [employer] Cher[employer]  The above information is an  only  It is not intended as medical advice for individual conditions or treatments  Talk to your doctor, nurse or pharmacist before following any medical regimen to see if it is safe and effective for you

## 2022-03-30 ENCOUNTER — ANESTHESIA EVENT (OUTPATIENT)
Dept: GASTROENTEROLOGY | Facility: HOSPITAL | Age: 57
End: 2022-03-30

## 2022-03-30 ENCOUNTER — HOSPITAL ENCOUNTER (OUTPATIENT)
Dept: GASTROENTEROLOGY | Facility: HOSPITAL | Age: 57
Setting detail: OUTPATIENT SURGERY
Discharge: HOME | End: 2022-03-30
Attending: INTERNAL MEDICINE
Payer: COMMERCIAL

## 2022-03-30 ENCOUNTER — ANESTHESIA (OUTPATIENT)
Dept: GASTROENTEROLOGY | Facility: HOSPITAL | Age: 57
End: 2022-03-30

## 2022-03-30 VITALS
DIASTOLIC BLOOD PRESSURE: 78 MMHG | TEMPERATURE: 98.1 F | RESPIRATION RATE: 18 BRPM | HEIGHT: 69 IN | BODY MASS INDEX: 24.59 KG/M2 | OXYGEN SATURATION: 99 % | HEART RATE: 51 BPM | WEIGHT: 166 LBS | SYSTOLIC BLOOD PRESSURE: 138 MMHG

## 2022-03-30 DIAGNOSIS — Z12.11 ENCOUNTER FOR SCREENING FOR MALIGNANT NEOPLASM OF COLON: ICD-10-CM

## 2022-03-30 PROCEDURE — 88305 TISSUE EXAM BY PATHOLOGIST: CPT | Performed by: PATHOLOGY

## 2022-03-30 RX ORDER — SODIUM CHLORIDE, SODIUM LACTATE, POTASSIUM CHLORIDE, CALCIUM CHLORIDE 600; 310; 30; 20 MG/100ML; MG/100ML; MG/100ML; MG/100ML
INJECTION, SOLUTION INTRAVENOUS CONTINUOUS PRN
Status: DISCONTINUED | OUTPATIENT
Start: 2022-03-30 | End: 2022-03-30

## 2022-03-30 RX ORDER — LIDOCAINE HYDROCHLORIDE 10 MG/ML
INJECTION, SOLUTION EPIDURAL; INFILTRATION; INTRACAUDAL; PERINEURAL AS NEEDED
Status: DISCONTINUED | OUTPATIENT
Start: 2022-03-30 | End: 2022-03-30

## 2022-03-30 RX ORDER — PROPOFOL 10 MG/ML
INJECTION, EMULSION INTRAVENOUS CONTINUOUS PRN
Status: DISCONTINUED | OUTPATIENT
Start: 2022-03-30 | End: 2022-03-30

## 2022-03-30 RX ORDER — PROPOFOL 10 MG/ML
INJECTION, EMULSION INTRAVENOUS AS NEEDED
Status: DISCONTINUED | OUTPATIENT
Start: 2022-03-30 | End: 2022-03-30

## 2022-03-30 RX ADMIN — LIDOCAINE HYDROCHLORIDE 50 MG: 10 INJECTION, SOLUTION EPIDURAL; INFILTRATION; INTRACAUDAL at 09:29

## 2022-03-30 RX ADMIN — SODIUM CHLORIDE, SODIUM LACTATE, POTASSIUM CHLORIDE, AND CALCIUM CHLORIDE: .6; .31; .03; .02 INJECTION, SOLUTION INTRAVENOUS at 09:22

## 2022-03-30 RX ADMIN — PROPOFOL 50 MG: 10 INJECTION, EMULSION INTRAVENOUS at 09:30

## 2022-03-30 RX ADMIN — Medication 40 MG: at 09:35

## 2022-03-30 RX ADMIN — PROPOFOL 150 MG: 10 INJECTION, EMULSION INTRAVENOUS at 09:29

## 2022-03-30 RX ADMIN — PROPOFOL 120 MCG/KG/MIN: 10 INJECTION, EMULSION INTRAVENOUS at 09:30

## 2022-03-30 NOTE — H&P
History and Physical - [facility] at [address] [place] [address]  49 y o  male MRN: [id number]                  HPI: Shani Box  is a 49y o  year old male who presents for colonoscopy for colon cancer screening  This is patient's index colonoscopy  REVIEW OF SYSTEMS: Per the HPI, and otherwise unremarkable  Historical Information   Past Medical History:   Diagnosis Date    Elevated ALT measurement     Slight elevation- will be rechecked in 3-6 months   Hyperlipidemia     Known health problems: none     Screening for colon cancer      Past Surgical History:   Procedure Laterality Date    ELBOW SURGERY      LEG SURGERY       Social History   Social History     Substance and Sexual Activity   Alcohol Use Yes    Comment: Pt will drink on a friday     Social History     Substance and Sexual Activity   Drug Use Never     Social History     Tobacco Use   Smoking Status Former Smoker    Packs/day: 0 50    Years: 20 00    Pack years: 10 00    Types: Cigarettes   Smokeless Tobacco Never Used   Tobacco Comment    Pt quit in 2016     Family History   Problem Relation Age of Onset    No Known Problems Mother     Heart disease Father     COPD Father        Meds/Allergies     (Not in a hospital admission)      No Known Allergies    Objective     Blood pressure 136/81, pulse (!) 47, temperature 97 9 °F (36 6 °C), temperature source Oral, resp  rate 20, height 5' 9" (1 753 m), weight 75 3 kg (166 lb), SpO2 97 %  PHYSICAL EXAM    Gen: NAD  CV: RRR  CHEST: Clear  ABD: soft, NT/ND  EXT: no edema      ASSESSMENT/PLAN:  This is a 49y o  year old male here for colonoscopy for colon cancer screening  Patient is stable and optimized for procedure      PLAN:   Procedure:  Colonoscopy

## 2022-03-30 NOTE — ANESTHESIA PREPROCEDURE EVALUATION
Procedure:  COLONOSCOPY    Relevant Problems   ANESTHESIA (within normal limits)      CARDIO   (-) MORALES (dyspnea on exertion)      ENDO   (-) Diabetes mellitus, type 2 (HCC)      GI/HEPATIC  bowel prep      /RENAL   (-) Kidney disease      HEMATOLOGY   (-) Coagulation disorder (HCC)   (-) Hypercoagulable state (HCC)      MUSCULOSKELETAL (within normal limits)      NEURO/PSYCH   (-) Headache   (-) Paresthesia   (-) Seizures (HCC)      PULMONARY   (-) Asthma   (-) Sleep apnea   (-) Smoking      CONCLUSIONS:   Sinus bradycardia   Nonspecific T wave abnormality   Abnormal ECG   No previous ECGs available      Physical Exam    Airway    Mallampati score: I  TM Distance: >3 FB  Neck ROM: full     Dental   No notable dental hx     Cardiovascular  Rhythm: regular,     Pulmonary  Pulmonary exam normal     Other Findings        Anesthesia Plan  ASA Score- 1     Anesthesia Type- IV sedation with anesthesia with ASA Monitors  Additional Monitors:   Airway Plan:           Plan Factors-Exercise tolerance (METS): >4 METS  Chart reviewed  Patient summary reviewed  Patient is not a current smoker  Induction- intravenous  Postoperative Plan-     Informed Consent- Anesthetic plan and risks discussed with patient  I personally reviewed this patient with the CRNA  Discussed and agreed on the Anesthesia Plan with the CRNA  Dariusz Altman

## 2022-03-30 NOTE — ANESTHESIA POSTPROCEDURE EVALUATION
Post-Op Assessment Note    CV Status:  Stable  Pain Score: 0    Pain management: adequate     Mental Status:  Alert and awake   Hydration Status:  Euvolemic   PONV Controlled:  Controlled   Airway Patency:  Patent      Post Op Vitals Reviewed: Yes      Staff: CRNA         No complications documented      /64 (03/30/22 0958)    Temp 97 6 °F (36 4 °C) (03/30/22 0958)    Pulse (!) 53 (03/30/22 0958)   Resp 18 (03/30/22 0958)    SpO2 98 % (03/30/22 0958)

## 2023-06-17 ENCOUNTER — APPOINTMENT (EMERGENCY)
Dept: CT IMAGING | Facility: HOSPITAL | Age: 58
End: 2023-06-17
Payer: COMMERCIAL

## 2023-06-17 ENCOUNTER — HOSPITAL ENCOUNTER (EMERGENCY)
Facility: HOSPITAL | Age: 58
Discharge: HOME | End: 2023-06-17
Attending: EMERGENCY MEDICINE
Payer: COMMERCIAL

## 2023-06-17 VITALS
BODY MASS INDEX: 26.66 KG/M2 | HEIGHT: 69 IN | HEART RATE: 47 BPM | WEIGHT: 180 LBS | OXYGEN SATURATION: 96 % | TEMPERATURE: 97.5 F | RESPIRATION RATE: 22 BRPM | DIASTOLIC BLOOD PRESSURE: 88 MMHG | SYSTOLIC BLOOD PRESSURE: 148 MMHG

## 2023-06-17 DIAGNOSIS — R42 DIZZINESS: Primary | ICD-10-CM

## 2023-06-17 DIAGNOSIS — R42 VERTIGO: ICD-10-CM

## 2023-06-17 LAB
ALBUMIN SERPL BCP-MCNC: 4.2 G/DL (ref 3.5–5)
ALP SERPL-CCNC: 50 U/L (ref 34–104)
ALT SERPL W P-5'-P-CCNC: 19 U/L (ref 7–52)
ANION GAP SERPL CALCULATED.3IONS-SCNC: 7 MMOL/L (ref 4–13)
AST SERPL W P-5'-P-CCNC: 15 U/L (ref 13–39)
BASOPHILS # BLD AUTO: 0.05 THOUSANDS/ÂΜL (ref 0–0.1)
BASOPHILS NFR BLD AUTO: 1 % (ref 0–1)
BILIRUB SERPL-MCNC: 0.58 MG/DL (ref 0.2–1)
BUN SERPL-MCNC: 14 MG/DL (ref 5–25)
CALCIUM SERPL-MCNC: 9 MG/DL (ref 8.4–10.2)
CARDIAC TROPONIN I PNL SERPL HS: 3 NG/L
CHLORIDE SERPL-SCNC: 109 MMOL/L (ref 96–108)
CO2 SERPL-SCNC: 24 MMOL/L (ref 21–32)
CREAT SERPL-MCNC: 0.89 MG/DL (ref 0.6–1.3)
EOSINOPHIL # BLD AUTO: 0.1 THOUSAND/ÂΜL (ref 0–0.61)
EOSINOPHIL NFR BLD AUTO: 2 % (ref 0–6)
ERYTHROCYTE [DISTWIDTH] IN BLOOD BY AUTOMATED COUNT: 12.9 % (ref 11.6–15.1)
GFR SERPL CREATININE-BSD FRML MDRD: 95 ML/MIN/1.73SQ M
GLUCOSE SERPL-MCNC: 96 MG/DL (ref 65–140)
HCT VFR BLD AUTO: 45.9 % (ref 36.5–49.3)
HGB BLD-MCNC: 15.9 G/DL (ref 12–17)
IMM GRANULOCYTES # BLD AUTO: 0.02 THOUSAND/UL (ref 0–0.2)
IMM GRANULOCYTES NFR BLD AUTO: 0 % (ref 0–2)
LYMPHOCYTES # BLD AUTO: 1.35 THOUSANDS/ÂΜL (ref 0.6–4.47)
LYMPHOCYTES NFR BLD AUTO: 24 % (ref 14–44)
MAGNESIUM SERPL-MCNC: 2.2 MG/DL (ref 1.9–2.7)
MCH RBC QN AUTO: 33.3 PG (ref 26.8–34.3)
MCHC RBC AUTO-ENTMCNC: 34.6 G/DL (ref 31.4–37.4)
MCV RBC AUTO: 96 FL (ref 82–98)
MONOCYTES # BLD AUTO: 0.74 THOUSAND/ÂΜL (ref 0.17–1.22)
MONOCYTES NFR BLD AUTO: 13 % (ref 4–12)
NEUTROPHILS # BLD AUTO: 3.3 THOUSANDS/ÂΜL (ref 1.85–7.62)
NEUTS SEG NFR BLD AUTO: 60 % (ref 43–75)
NRBC BLD AUTO-RTO: 0 /100 WBCS
PLATELET # BLD AUTO: 224 THOUSANDS/UL (ref 149–390)
PMV BLD AUTO: 8.6 FL (ref 8.9–12.7)
POTASSIUM SERPL-SCNC: 3.9 MMOL/L (ref 3.5–5.3)
PROT SERPL-MCNC: 6.7 G/DL (ref 6.4–8.4)
RBC # BLD AUTO: 4.78 MILLION/UL (ref 3.88–5.62)
SODIUM SERPL-SCNC: 140 MMOL/L (ref 135–147)
TSH SERPL DL<=0.05 MIU/L-ACNC: 2.6 UIU/ML (ref 0.45–4.5)
WBC # BLD AUTO: 5.56 THOUSAND/UL (ref 4.31–10.16)

## 2023-06-17 PROCEDURE — G1004 CDSM NDSC: HCPCS

## 2023-06-17 PROCEDURE — 93005 ELECTROCARDIOGRAM TRACING: CPT

## 2023-06-17 PROCEDURE — 84484 ASSAY OF TROPONIN QUANT: CPT | Performed by: EMERGENCY MEDICINE

## 2023-06-17 PROCEDURE — 84443 ASSAY THYROID STIM HORMONE: CPT | Performed by: EMERGENCY MEDICINE

## 2023-06-17 PROCEDURE — 36415 COLL VENOUS BLD VENIPUNCTURE: CPT | Performed by: EMERGENCY MEDICINE

## 2023-06-17 PROCEDURE — 85025 COMPLETE CBC W/AUTO DIFF WBC: CPT | Performed by: EMERGENCY MEDICINE

## 2023-06-17 PROCEDURE — 80053 COMPREHEN METABOLIC PANEL: CPT | Performed by: EMERGENCY MEDICINE

## 2023-06-17 PROCEDURE — 83735 ASSAY OF MAGNESIUM: CPT | Performed by: EMERGENCY MEDICINE

## 2023-06-17 PROCEDURE — 70450 CT HEAD/BRAIN W/O DYE: CPT

## 2023-06-17 PROCEDURE — 99284 EMERGENCY DEPT VISIT MOD MDM: CPT

## 2023-06-17 RX ORDER — MECLIZINE HYDROCHLORIDE 25 MG/1
25 TABLET ORAL 3 TIMES DAILY PRN
Qty: 30 TABLET | Refills: 0 | Status: SHIPPED | OUTPATIENT
Start: 2023-06-17

## 2023-06-17 RX ORDER — ASPIRIN 81 MG/1
81 TABLET, CHEWABLE ORAL DAILY
COMMUNITY

## 2023-06-17 RX ORDER — MECLIZINE HYDROCHLORIDE 25 MG/1
25 TABLET ORAL ONCE
Status: COMPLETED | OUTPATIENT
Start: 2023-06-17 | End: 2023-06-17

## 2023-06-17 RX ORDER — ROSUVASTATIN CALCIUM 20 MG/1
20 TABLET, COATED ORAL DAILY
COMMUNITY
Start: 2023-01-24

## 2023-06-17 RX ADMIN — MECLIZINE HYDROCHLORIDE 25 MG: 25 TABLET ORAL at 18:55

## 2023-06-17 NOTE — ED PROVIDER NOTES
History  Chief Complaint   Patient presents with   • Dizziness     Started Thursday and pt feels dizzy  Today was the first day he felt the dizziness with the feeling of the room spinning     Patient is a 70-year-old male presenting to the emergency department complaining of dizziness that is been going on for the past 3 days, states that started on Thursday with the dizzy lightheadedness sensation, today he feels a sensation of the room spinning, he denies any specific triggers for this, he denies any chest pain, shortness of breath or palpitations, no neck pain, no headache, no vision changes, no history of similar symptoms previously, he denies any new medications or change in medication dosage, he has been eating and drinking appropriately but does admit to some increased salt in his diet recently, no nausea vomiting or diarrhea          Prior to Admission Medications   Prescriptions Last Dose Informant Patient Reported? Taking? Echinacea 450 MG CAPS   Yes No   Sig: Take 1 capsule by mouth 2 (two) times a day   aspirin 81 mg chewable tablet   Yes Yes   Sig: Chew 81 mg daily   rosuvastatin (CRESTOR) 20 MG tablet   Yes Yes   Sig: Take 20 mg by mouth daily      Facility-Administered Medications: None       Past Medical History:   Diagnosis Date   • Elevated ALT measurement     Slight elevation- will be rechecked in 3-6 months  • Hyperlipidemia    • Known health problems: none    • Screening for colon cancer        Past Surgical History:   Procedure Laterality Date   • ELBOW SURGERY     • LEG SURGERY         Family History   Problem Relation Age of Onset   • No Known Problems Mother    • Heart disease Father    • COPD Father      I have reviewed and agree with the history as documented      E-Cigarette/Vaping   • E-Cigarette Use Never User      E-Cigarette/Vaping Substances     Social History     Tobacco Use   • Smoking status: Former     Packs/day: 0 50     Years: 20 00     Total pack years: 10 00     Types: Cigarettes   • Smokeless tobacco: Never   • Tobacco comments:     Pt quit in 2016   Vaping Use   • Vaping Use: Never used   Substance Use Topics   • Alcohol use: Yes     Comment: Pt will drink on a friday   • Drug use: Never       Review of Systems   Constitutional: Negative  HENT: Negative  Eyes: Negative  Respiratory: Negative  Cardiovascular: Negative  Gastrointestinal: Negative  Endocrine: Negative  Genitourinary: Negative  Musculoskeletal: Negative  Skin: Negative  Allergic/Immunologic: Negative  Neurological: Positive for dizziness and light-headedness  Hematological: Negative  Psychiatric/Behavioral: Negative  Physical Exam  Physical Exam  Constitutional:       Appearance: Normal appearance  He is well-developed  HENT:      Head: Normocephalic and atraumatic  Nose: Nose normal       Mouth/Throat:      Mouth: Mucous membranes are moist    Eyes:      Extraocular Movements: Extraocular movements intact  Right eye: Normal extraocular motion and no nystagmus  Left eye: Normal extraocular motion and no nystagmus  Conjunctiva/sclera: Conjunctivae normal       Pupils: Pupils are equal, round, and reactive to light  Cardiovascular:      Rate and Rhythm: Normal rate  Pulmonary:      Effort: Pulmonary effort is normal    Abdominal:      Palpations: Abdomen is soft  Musculoskeletal:         General: Normal range of motion  Cervical back: Normal range of motion and neck supple  Skin:     General: Skin is warm and dry  Neurological:      Mental Status: He is alert and oriented to person, place, and time           Vital Signs  ED Triage Vitals   Temperature Pulse Respirations Blood Pressure SpO2   06/17/23 1828 06/17/23 1828 06/17/23 1828 06/17/23 1828 06/17/23 1828   97 5 °F (36 4 °C) 55 16 (!) 190/84 96 %      Temp src Heart Rate Source Patient Position - Orthostatic VS BP Location FiO2 (%)   -- 06/17/23 1852 06/17/23 1852 06/17/23 1828 -- Monitor Lying - Orthostatic VS Left arm       Pain Score       06/17/23 1828       No Pain           Vitals:    06/17/23 1852 06/17/23 1853 06/17/23 1854 06/17/23 2000   BP: 151/77 140/82 146/85 148/88   Pulse: (!) 53 (!) 53 55 (!) 47   Patient Position - Orthostatic VS: Lying - Orthostatic VS Sitting - Orthostatic VS Standing - Orthostatic VS          Visual Acuity      ED Medications  Medications   meclizine (ANTIVERT) tablet 25 mg (25 mg Oral Given 6/17/23 1855)       Diagnostic Studies  Results Reviewed     Procedure Component Value Units Date/Time    TSH [[id number]]  (Normal) Collected: 06/17/23 1902    Lab Status: Final result Specimen: Blood from Arm, Left Updated: 06/17/23 2015     TSH 3RD GENERATON 2 598 uIU/mL     Comprehensive metabolic panel [[id number]]  (Abnormal) Collected: 06/17/23 1902    Lab Status: Final result Specimen: Blood from Arm, Left Updated: 06/17/23 1940     Sodium 140 mmol/L      Potassium 3 9 mmol/L      Chloride 109 mmol/L      CO2 24 mmol/L      ANION GAP 7 mmol/L      BUN 14 mg/dL      Creatinine 0 89 mg/dL      Glucose 96 mg/dL      Calcium 9 0 mg/dL      AST 15 U/L      ALT 19 U/L      Alkaline Phosphatase 50 U/L      Total Protein 6 7 g/dL      Albumin 4 2 g/dL      Total Bilirubin 0 58 mg/dL      eGFR 95 ml/min/1 73sq m     Narrative:      [facility] for Chronic Kid[facility] Disease (CKD):   •  Stage 1 with normal or high GFR (GFR > 90 mL/min/1 73 square meters)  •  Stage 2 Mild CKD (GFR = 60-89 mL/min/1 73 square meters)  •  Stage 3A Moderate CKD (GFR = 45-59 mL/min/1 73 square meters)  •  Stage 3B Moderate CKD (GFR = 30-44 mL/min/1 73 square meters)  •  Stage 4 Severe CKD (GFR = 15-29 mL/min/1 73 square meters)  •  Stage 5 End Stage CKD (GFR <15 mL/min/1 73 square meters)  Note: GFR calculation is accurate only with a steady state creatinine    Magnesium [[id number]]  (Normal) Collected: 06/17/23 1902    Lab Status: Final result Specimen: Blood from Arm, Left Updated: 06/17/23 1940     Magnesium 2 2 mg/dL     HS Troponin 0hr (reflex protocol) [[id number]]  (Normal) Collected: 06/17/23 1902    Lab Status: Final result Specimen: Blood from Arm, Left Updated: 06/17/23 1934     hs TnI 0hr 3 ng/L     CBC and differential [[id number]]  (Abnormal) Collected: 06/17/23 1902    Lab Status: Final result Specimen: Blood from Arm, Left Updated: 06/17/23 1910     WBC 5 56 Thousand/uL      RBC 4 78 Million/uL      Hemoglobin 15 9 g/dL      Hematocrit 45 9 %      MCV 96 fL      MCH 33 3 pg      MCHC 34 6 g/dL      RDW 12 9 %      MPV 8 6 fL      Platelets 468 Thousands/uL      nRBC 0 /100 WBCs      Neutrophils Relative 60 %      Immat GRANS % 0 %      Lymphocytes Relative 24 %      Monocytes Relative 13 %      Eosinophils Relative 2 %      Basophils Relative 1 %      Neutrophils Absolute 3 30 Thousands/µL      Immature Grans Absolute 0 02 Thousand/uL      Lymphocytes Absolute 1 35 Thousands/µL      Monocytes Absolute 0 74 Thousand/µL      Eosinophils Absolute 0 10 Thousand/µL      Basophils Absolute 0 05 Thousands/µL                  CT head without contrast   Final Result by Carlos Ness MD (06/17 1939)      No acute intracranial abnormality                    Workstation performed: [id number]                    Procedures  ECG 12 Lead Documentation Only    Date/Time: 6/17/2023 8:17 PM    Performed by: Justin Ortiz DO  Authorized by: Justin Ortiz DO    Indications / Diagnosis:  Dizziness  ECG reviewed by me, the ED Provider: yes    Patient location:  ED  Previous ECG:     Comparison to cardiac monitor: Yes    Interpretation:     Interpretation: abnormal    Rate:     ECG rate:  52    ECG rate assessment: bradycardic    Rhythm:     Rhythm: sinus bradycardia    Ectopy:     Ectopy: none    QRS:     QRS intervals:  Normal  Conduction:     Conduction: normal    ST segments:     ST segments:  Normal  T waves:     T waves: normal               ED Course  ED Course as of 06/17/23 2021   Sat Jun 17, 2023 2019 TSH   2019 Magnesium   2019 HS Troponin 0hr (reflex protocol)   2019 CBC and differential(!)   2019 Comprehensive metabolic panel(!)   2558 CT head without contrast   2020 Patient reports feeling well, stable vital signs with chronic bradycardia, able to ambulate in the emergency department without any further symptoms                               SBIRT 20yo+    Flowsheet Row Most Recent Value   Initial Alcohol Screen: US AUDIT-C     1  How often do you have a drink containing alcohol? 0 Filed at: 06/17/2023 1830   2  How many drinks containing alcohol do you have on a typical day you are drinking? 0 Filed at: 06/17/2023 1830   3a  Male UNDER 65: How often do you have five or more drinks on one occasion? 0 Filed at: 06/17/2023 1830   Audit-C Score 0 Filed at: 06/17/2023 1830   ROBERT: How many times in the past year have you    Used an illegal drug or used a prescription medication for non-medical reasons? Never Filed at: 06/17/2023 1830                    Medical Decision Making  This patient presents with dizziness/lightheadedness  Symptoms most likely consistent with a peripheral cause, likely BPPV  No history of recent infection so doubt vestibular neuritis  History not consistent with Ménière's disease  No history of trauma  No red flag features for central vertigo to include gradual onset, no vertical nystagmus, focal neurologic findings on exam, including inability to ambulate ataxia or dysmetria  Presentation not consistent with acute CNS infection, vertebrobasilar artery insufficiency, cerebellar hemorrhage or infarction or intracranial mass or bleed  Dizziness: acute illness or injury  Vertigo: acute illness or injury  Amount and/or Complexity of Data Reviewed  Labs: ordered  Radiology: ordered  ECG/medicine tests: ordered and independent interpretation performed       Details: Sinus bradycardia, previous outpatient office visits reveal a heart rate in the 50s similar to today      Risk  Prescription drug management  Disposition  Final diagnoses:   Dizziness   Vertigo     Time reflects when diagnosis was documented in both MDM as applicable and the Disposition within this note     Time User Action Codes Description Comment    6/17/2023  8:09 PM Jose Collins Add [R42] Dizziness     6/17/2023  8:09 PM Jose Collins Add [R42] Vertigo       ED Disposition     ED Disposition   Discharge    Condition   Stable    Date/Time   Sat Jun 17, 2023  8:09 PM    Comment   [facility]  discharge to home/self care  Follow-up Information     Follow up With Specialties Details Why Bubba Horton MD Family Medicine In 2 days  [address] [place] [address]  [phone]            Patient's Medications   Discharge Prescriptions    MECLIZINE (ANTIVERT) 25 MG TABLET    Take 1 tablet (25 mg total) by mouth 3 (three) times a day as needed for dizziness       Start Date: 6/17/2023 End Date: --       Order Dose: 25 mg       Quantity: 30 tablet    Refills: 0       No discharge procedures on file      PDMP Review     None          ED Provider  Electronically Signed by           Huy Ang DO  06/17/23 2019       Huy Ang DO  06/17/23 2021

## 2023-06-20 LAB
ATRIAL RATE: 52 BPM
P AXIS: 44 DEGREES
PR INTERVAL: 156 MS
QRS AXIS: -11 DEGREES
QRSD INTERVAL: 86 MS
QT INTERVAL: 434 MS
QTC INTERVAL: 403 MS
T WAVE AXIS: 59 DEGREES
VENTRICULAR RATE: 52 BPM

## 2023-06-20 PROCEDURE — 93010 ELECTROCARDIOGRAM REPORT: CPT | Performed by: INTERNAL MEDICINE
